# Patient Record
Sex: MALE | Race: WHITE | NOT HISPANIC OR LATINO | URBAN - METROPOLITAN AREA
[De-identification: names, ages, dates, MRNs, and addresses within clinical notes are randomized per-mention and may not be internally consistent; named-entity substitution may affect disease eponyms.]

---

## 2017-04-20 ENCOUNTER — COMMUNICATION - HEALTHEAST (OUTPATIENT)
Dept: HEALTH INFORMATION MANAGEMENT | Facility: CLINIC | Age: 37
End: 2017-04-20

## 2017-10-11 ENCOUNTER — OFFICE VISIT (OUTPATIENT)
Dept: FAMILY MEDICINE | Facility: CLINIC | Age: 37
End: 2017-10-11

## 2017-10-11 VITALS
SYSTOLIC BLOOD PRESSURE: 124 MMHG | DIASTOLIC BLOOD PRESSURE: 76 MMHG | OXYGEN SATURATION: 97 % | HEART RATE: 64 BPM | TEMPERATURE: 98.2 F | WEIGHT: 275.2 LBS

## 2017-10-11 DIAGNOSIS — J02.0 STREPTOCOCCAL SORE THROAT: Primary | ICD-10-CM

## 2017-10-11 DIAGNOSIS — K13.0 LIP FISSURE: ICD-10-CM

## 2017-10-11 LAB — S PYO AG THROAT QL IA.RAPID: NEGATIVE

## 2017-10-11 NOTE — PATIENT INSTRUCTIONS
You have a fissure in your upper lip that has not healed. I would like you see ENT for this. You can stop at the front and see Lori    Your strep came back negative, but will start you on 10 days of antibiotics, will send out the culture and will call you once the result comes back    Return to clinic 1-2 weeks after seeing ENT or if your symptoms worsens    [facility]  [address]  [place] [address] Main Number: [phone]  Fax: [phone]    Appointment  Date:10/16/17  Time:8:10am arrival    Please contact the above clinic if you need to cancel or reschedule. Feel free to contact me with any questions. Thanks!    Lori  Referral Coordinator  [phone]    Called and gave to patient over the phone.     *Park in the gold ramp.

## 2017-10-11 NOTE — LETTER
October 13, 2017      Nima Castellanos  [address] [place] [address]        Dear Nima,    Please see below for your test results.    Resulted Orders   Rapid Strep Screen (Group) ([facility])   Result Value Ref Range    Rapid Strep A Screen NEGATIVE Negative   Group A Strep Throat ([facility])   Result Value Ref Range    Group A Strep,Throat No Group A Strep rRNA detected No Group A Strep rRNA detected    Narrative    Test performed by:  [facility] [address] [place] [address]  Intended Use:  The GEN-PROBE Group A Streptococcus direct test is a DNA probe assay which   uses nucleic acid hybridization for the qualitative detection of Group A   Streptococcal RNA to aid in the diagnosis of Group A Streptococcal pharyngitis   from throat swabs.  Methodology:  The GEN-PROBE DNA probe assay uses a single-stranded DNA probe with a   chemiluminescent label, which is complementary to the ribosomal RNA of the   target organism.  The labeled DNA probe combines with the ribosomal RNA to   form a stable DNA:RNA hybrid.  The labeled DNA:RNA hybrids are measured in   GEN-PROBE luminometer.  A positive result is a luminometer reading greater   than or equal to the cut-off.  A value below this cut-off is a negative   result.       There was no strep A  In the confirmatory test, no need for antibiotics    If you have any questions, please call the clinic to make an appointment.    Sincerely,    Roc Garcia MD

## 2017-10-11 NOTE — MR AVS SNAPSHOT
After Visit Summary   10/11/2017    Nima Castellanos    MRN: [id number]           Patient Information     Date Of Birth          1980        Visit Information        Provider Department      10/11/2017 10:40 AM Roc Garcia MD [facility]        Today's Diagnoses     Streptococcal sore throat    -  1    Lip fissure          Care Instructions    You have a fissure in your upper lip that has not healed. I would like you see ENT for this. You can stop at the front and see Lori    Your strep came back negative, but will start you on 10 days of antibiotics, will send out the culture and will call you once the result comes back    Return to clinic 1-2 weeks after seeing ENT or if your symptoms worsens            Follow-ups after your visit        Additional Services     OTOLARYNGOLOGY REFERRAL       Upper lip fissure not healing for over 12 months   No obvious risk factors                  Future tests that were ordered for you today     Open Future Orders        Priority Expected Expires Ordered    OTOLARYNGOLOGY REFERRAL Routine  10/31/2017 10/11/2017            Who to contact     Please call your clinic at [phone] to:    Ask questions about your health    Make or cancel appointments    Discuss your medicines    Learn about your test results    Speak to your doctor   If you have compliments or concerns about an experience at your clinic, or if you wish to file a complaint, please contact [facility] Patient Relations at [phone] or email us at [email or web address]         Additional Information About Your Visit        MyChart Information     [employer] is an electronic gateway that provides easy, online access to your medical records. With Minded, you can request a clinic appointment, read your test results, renew a prescription or communicate with your care team.     To sign up for [employer] visit the website at [email or web address]   You will  be asked to enter the access code listed below, as well as some personal information. Please follow the directions to create your username and password.     Your access code is: [email or web address]  Expires: 2018 11:29 AM     Your access code will  in 90 days. If you need help or a new code, please contact your [facility] or call [phone] for assistance.        Care EveryWhere ID     This is your Care EveryWhere ID. This could be used by other organizations to access your [place] medical records  [id number]        Your Vitals Were     Pulse Temperature Pulse Oximetry             64 98.2  F (36.8  C) (Oral) 97%          Blood Pressure from Last 3 Encounters:   10/11/17 124/76    Weight from Last 3 Encounters:   10/11/17 275 lb 3.2 oz (124.8 kg)              We Performed the Following     Group A Strep Throat ([facility])     Rapid Strep Screen (Group) ([facility])        Primary Care Provider Office Phone # Fax #    Roc Garcia [phone]       [address] [place] [id number]        Equal Access to Services     [facility]: Hadii aad ku hadasho Soaleksali, waaxda luqadaha, qaybta kaalmada jose r, ashleigh anders . So [facility] [phone].    ATENCIÓN: Si habla español, tiene a haddad disposición servicios gratuitos de asistencia lingüística. [facility] [phone].    We comply with applicable federal civil rights laws and [place] laws. We do not discriminate on the basis of race, color, national origin, age, disability, sex, sexual orientation, or gender identity.            Thank you!     Thank you for choosing [facility]  for your care. Our goal is always to provide you with excellent care. Hearing back from our patients is one way we can continue to improve our services. Please take a few minutes to complete the written survey that you may receive in the mail after your visit with us. Thank you!             Your Updated Medication  List - Protect others around you: Learn how to safely use, store and throw away your medicines at www.disposemymeds.org.      Notice  As of 10/11/2017 11:29 AM    You have not been prescribed any medications.

## 2017-10-11 NOTE — PROGRESS NOTES
"S: Nima Castellanos is a 37 year old male who returns has hx of strep A pharyngitis 6 months ago, now with sore troath for 2 to 3 days, took amoxacillin one time last night  2 had son with strep in the past but child doesn't not have sore throat now   Risk factors are minimum: account  2 Fissure in lip that wants to have it check out, nor healing for a long time, no hx of colds sores, no trauma. 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      ROS:  General: No fevers, chills  Head: No headache  Ears: No acute change in hearing.    CV: No chest pain or palpitations.  Resp: No shortness of breath.  No cough. No hemoptysis.  GI: No nausea, vomiting, constipation, diarrhea  : No urinary pains    O: /76  Pulse 64  Temp 98.2  F (36.8  C) (Oral)  Wt 275 lb 3.2 oz (124.8 kg)  SpO2 97%   Gen:  Well nourished and in NAD  HEENT: PERRLA; TMs normal color and landmarks; nasopharynx pink and    moist; oropharynx pink and moist   Upper left side  Lip:  deep fissure up to vermilion border .  1 cm lateral location  Neck: supple without lymphadenopathy  CV:  RRR  - no murmurs, rubs, or gallups,   Pulm:  CTAB, no wheezes/rales/rhonchi, good air entry   ABD: soft, nontender, no masses, no rebound, BS intact throughout  Extrem: no cyanosis, edema or clubbing  Psych: Euthymic      (J02.0) Streptococcal sore throat  (primary encounter diagnosis)  Comment normal exam  Plan: Rapid Strep Screen (Group) ([facility]), Group A         Strep Throat ([facility])     Expect confirmatory test to be negative   Took one time dose of amoxicillin 10 hours prior to test _expect that it did not altered  test   Consider re testing in 2 weeks  (K13.0) Lip fissure.concer not healing for a long time\" over one year\"    Plan: OTOLARYNGOLOGY REFERRAL          RTC in 2  weeks, for follow up of " Re test for strep a or sooner if develops new or worsening symptoms.    Roc Garcia

## 2017-10-12 LAB — GROUP A STREP,THROAT: NORMAL

## 2018-01-15 ENCOUNTER — TRANSFERRED RECORDS (OUTPATIENT)
Dept: HEALTH INFORMATION MANAGEMENT | Facility: CLINIC | Age: 38
End: 2018-01-15

## 2020-03-29 ENCOUNTER — OFFICE VISIT - HEALTHEAST (OUTPATIENT)
Dept: FAMILY MEDICINE | Facility: CLINIC | Age: 40
End: 2020-03-29

## 2020-03-29 DIAGNOSIS — M79.672 LEFT FOOT PAIN: ICD-10-CM

## 2020-03-29 DIAGNOSIS — M76.891 HAMSTRING TENDONITIS OF RIGHT THIGH: ICD-10-CM

## 2020-03-29 ASSESSMENT — MIFFLIN-ST. JEOR: SCORE: 2297.92

## 2020-03-30 ENCOUNTER — COMMUNICATION - HEALTHEAST (OUTPATIENT)
Dept: SCHEDULING | Facility: CLINIC | Age: 40
End: 2020-03-30

## 2020-07-28 ENCOUNTER — TRANSFERRED RECORDS (OUTPATIENT)
Dept: HEALTH INFORMATION MANAGEMENT | Facility: CLINIC | Age: 40
End: 2020-07-28

## 2020-09-10 ENCOUNTER — COMMUNICATION - HEALTHEAST (OUTPATIENT)
Dept: CARDIOLOGY | Facility: CLINIC | Age: 40
End: 2020-09-10

## 2020-09-11 ENCOUNTER — OFFICE VISIT - HEALTHEAST (OUTPATIENT)
Dept: CARDIOLOGY | Facility: CLINIC | Age: 40
End: 2020-09-11

## 2020-09-11 DIAGNOSIS — R07.9 CHEST PAIN, UNSPECIFIED TYPE: ICD-10-CM

## 2020-09-15 ENCOUNTER — AMBULATORY - HEALTHEAST (OUTPATIENT)
Dept: CARDIOLOGY | Facility: CLINIC | Age: 40
End: 2020-09-15

## 2020-09-15 DIAGNOSIS — R07.9 CHEST PAIN, UNSPECIFIED TYPE: ICD-10-CM

## 2021-06-04 VITALS
OXYGEN SATURATION: 95 % | DIASTOLIC BLOOD PRESSURE: 72 MMHG | BODY MASS INDEX: 35.87 KG/M2 | SYSTOLIC BLOOD PRESSURE: 102 MMHG | WEIGHT: 287 LBS | RESPIRATION RATE: 20 BRPM | HEART RATE: 67 BPM

## 2021-06-04 VITALS
BODY MASS INDEX: 35.56 KG/M2 | HEIGHT: 75 IN | SYSTOLIC BLOOD PRESSURE: 118 MMHG | WEIGHT: 286 LBS | DIASTOLIC BLOOD PRESSURE: 79 MMHG | RESPIRATION RATE: 20 BRPM | TEMPERATURE: 98.2 F | HEART RATE: 51 BPM | OXYGEN SATURATION: 96 %

## 2021-06-07 NOTE — PROGRESS NOTES
"Assessment/Plan:     Problem List Items Addressed This Visit     None      Visit Diagnoses     Hamstring tendonitis of right thigh    -  Primary    Left foot pain        Relevant Orders    Ambulatory referral to Podiatry        I discussed the diagnosis and explanation for his thigh pain.  I recommended hamstring exercises at this time and to take over-the-counter pain medication as needed.  Consider reevaluation if it is not improving in the next couple of weeks.  I also placed a referral for podiatry.  He may have some low-grade arthritis but I suspect that assessing him for possible orthotic to support the midfoot when running would be a good idea.  The patient was comfortable with that plan.    Subjective:       40 y.o. male presents for evaluation primarily of right posterior upper thigh pain.  However, he also would like some advice regarding left foot pain.  The patient reports onset yesterday evening of pain at the bottom of his right buttock.  The patient states that it was fairly mild last night but then he felt it more intense today.  His primary concern was whether it could represent a blood clot.  He has been trying to exercise a bit more and he has been running recently.  The patient states that over the past month when he runs he gets pain in his midfoot on the top.  He has not noticed any associated swelling and has no prior history for injury to that foot.      Reviewed: The following portions of the patient's history were reviewed and updated as appropriate: allergies, current medications, past family history, past medical history, past social history, past surgical history and problem list.    Review of Systems  Pertinent items are noted in HPI.        Objective:     /79 (Patient Site: Right Arm, Patient Position: Sitting, Cuff Size: Adult Large)   Pulse (!) 51   Temp 98.2  F (36.8  C) (Oral)   Resp 20   Ht 6' 3\" (1.905 m)   Wt (!) 286 lb (129.7 kg)   SpO2 96%   BMI 35.75 kg/m  "   General appearance: alert, appears stated age and cooperative   Right upper leg: reproducible tenderness when palpating over the lateral tendon insertion of the hamstring muscle at the pelvis  Left foot: tenderness of the mid foot over a joint without associated swelling      This note has been dictated using voice recognition software. Any grammatical or context distortions are unintentional and inherent to the software
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
## 2021-06-11 NOTE — TELEPHONE ENCOUNTER
"Return call to patient. At start of phone call patient states \"I have no bala in you guys\" (patient has not been evaluated in heart clinic before) and \"I wanted a call from someone to tell me what to do before my appointment tomorrow because I am still having the same symptoms.\" Patient complained that person who scheduled his appointment was of no help-- explained to him that it is not the role or appropriate for a  to give him medical advice.  Attempted to explain to patient that this writer or Dr. Hernandez cannot provide medical advice to a patient they have not evaluated before. Instead of listening to this writer, patient proceeded to describe his Urgent Care visit and ED visit in entirety, complaining about rude staff. Encouraged patient to follow guidance provided by ED provider or contact his PCP. Patient did NOT verbalize understanding and repeatedly stated he did not have bala in doctors. -ejb    ----- Message from Abimbola Carreno sent at 9/10/2020 10:37 AM CDT -----  Regarding: [facility] PT / RAC APPT ON 9/11/20  General phone call:    Caller: Nima Zamora     Primary cardiologist: O[facility]RA     Detailed reason for call: Pt has a RAC appt scheduled with [facility] on 9/11 and is requesting a call back on recommendations on what to do in the meantime for his symptoms. Pt states he was sent home from [facility] ED on 9/9 while his symptoms were still active - he doesn't want to go back to the ED if it persists.     Best phone number: [phone]    Best time to contact: Today     Ok to leave a detailed message? Yes     Device? No     Additional Info:          "

## 2021-06-11 NOTE — TELEPHONE ENCOUNTER
Wellness Screening Tool  Symptom Screening:  Do you have one of the following NEW symptoms:    Fever (subjective or >100.0)?  No    A new cough?  No    Shortness of breath?  No     Chills? No     New loss of taste or smell? No     Generalized body aches? No     New persistent headache? No     New sore throat? No     Nausea, vomiting, or diarrhea?  No    Within the past 2 weeks, have you been exposed to someone with a known positive illness below:    COVID-19 (known or suspected)?  No    Chicken pox?  No    Mealses?  No    Pertussis?  No    Patient notified of visitor policy- They may have one person accompany them to their appointment, but they will need to wear a mask and will be screened upon arrival for symptoms: Yes  Pt informed to wear a mask: Yes  Pt notified if they develop any symptoms listed above, prior to their appointment, they are to call the clinic directly at [phone] for further instructions.  Yes  Patient's appointment status: Patient will be seen in clinic as scheduled on 9/11/20

## 2021-06-11 NOTE — PROGRESS NOTES
Consultation - [facility]  Nima Castellanos,  1980, MRN [id number]    PCP: Provider, [facility], [phone]    Assessment and Plan: Atypical chest pain  Sx not characteristic of angina but given the nature of his symptoms and age I did exclude cardiac origin will arrange for stress echo.  Recommendations: Echo    Chief Complaint: Atypical chest pain    HPI:  We have been requested by ER to evaluate Nima Castellanos for consultation who is a  40 y.o. year old male for above chief complaint.  Hx: 32-year-old man who came to the emergency room because of chest pain.  3-day history of recurring back pains that shoot into his chest.  He has felt occasional palpitations.  Denied shortness of breath GI symptoms dizziness or syncope.  Patient is a non-smoker.  Laboratory showed normal troponin.  Normal d-dimer.  EG normal sinus rhythm normal findings.  He describes the symptom as a very brief symptom that occurs in the chest that sometimes is feeling that is coming from his back.  Almost an instant and duration can happen a couple times has no clear provocative factors nothing seems to improve it typically is not at all exertional is not necessarily painful just a discomfort.  No current outpatient medications on file.  Medical History  Active Ambulatory (Non-Hospital) Problems    Diagnosis     Lip lesion     Anxiety     Chest pain     No past medical history on file.    Surgical History  He  has no past surgical history on file.    Social History  Reviewed, and he  reports that he has never smoked. He has never used smokeless tobacco.  Smoking status reviewed.  Social history othrwise not contributory to HPI.  Allergies  No Known Allergies    Family History  Reviewed, and family history is not on file.  Extended Emergency Contact Information  Primary Emergency Contact: Ratna Castellanos  Home Phone: [phone]  Relation: Spouse  Secondary Emergency Contact: declined, per pt  Relation:  Declined  Family history otherwise negative or not conributory to HPI.    Psychosocial Needs  Social History     Social History Narrative     Not on file     Additional psychosocial needs reviewed per nursing assessment.    Prior to Admission Medications  (Not in a hospital admission)      Review of Systems:  Pertinent items are noted in HPI.  A 12 point comprehensive review of systems was negative except as noted.  Review of systems is negative except for HPI  Physical Exam:  There were no vitals filed for this visit.  Head and neck without focal cranial neurologic defects.  JVD not distended.  Carotid upstroke normal without bruit.  External eye exam normal without icterus.  External ear exam normal.  Neck without cervical lymphadenopathy or thyromegaly.  Cardiac: S1-S2 distinct and regular  Lungs: Clear  Abdomen with normal bowel tones.  Skin without rash, ecchymosis, lesions.  Neuromuscular tone normal.  Peripheral pulse intact and equal.  Joints without swelling or erythema.    Pertinent Labs  Lab Results: personally reviewed.   Lab Results   Component Value Date     09/09/2020    K 4.1 09/09/2020     09/09/2020    CO2 25 09/09/2020    BUN 15 09/09/2020    CREATININE 1.07 09/09/2020    CALCIUM 9.7 09/09/2020     Lab Results   Component Value Date    TROPONINI <0.01 09/09/2020     Lab Results   Component Value Date    TROPONINI <0.01 09/09/2020    TROPONINI <0.01 09/09/2020     Lab Results   Component Value Date    WBC 7.1 09/09/2020    HGB 15.3 09/09/2020    HCT 44.7 09/09/2020    MCV 87 09/09/2020     09/09/2020     No results found for: CHOL, TRIG, HDL, LDLDIRECT    Pertinent Radiology  Radiology Results: See Report  EKG Results: personally reviewed.  and See Report     No current outpatient medications on file.

## 2021-06-16 PROBLEM — F41.9 ANXIETY: Status: ACTIVE | Noted: 2018-08-15

## 2021-06-16 PROBLEM — K13.0 LIP LESION: Status: ACTIVE | Noted: 2018-08-21

## 2021-08-14 ENCOUNTER — HEALTH MAINTENANCE LETTER (OUTPATIENT)
Age: 41
End: 2021-08-14

## 2021-10-09 ENCOUNTER — HEALTH MAINTENANCE LETTER (OUTPATIENT)
Age: 41
End: 2021-10-09

## 2022-05-23 ENCOUNTER — APPOINTMENT (OUTPATIENT)
Dept: URBAN - METROPOLITAN AREA CLINIC 260 | Age: 42
Setting detail: DERMATOLOGY
End: 2022-05-26

## 2022-05-23 VITALS — HEIGHT: 76 IN | WEIGHT: 300 LBS

## 2022-05-23 DIAGNOSIS — L663 OTHER SPECIFIED DISEASES OF HAIR AND HAIR FOLLICLES: ICD-10-CM

## 2022-05-23 DIAGNOSIS — L73.8 OTHER SPECIFIED FOLLICULAR DISORDERS: ICD-10-CM

## 2022-05-23 DIAGNOSIS — L30.1 DYSHIDROSIS [POMPHOLYX]: ICD-10-CM

## 2022-05-23 DIAGNOSIS — L81.4 OTHER MELANIN HYPERPIGMENTATION: ICD-10-CM

## 2022-05-23 PROBLEM — L02.425 FURUNCLE OF RIGHT LOWER LIMB: Status: ACTIVE | Noted: 2022-05-23

## 2022-05-23 PROCEDURE — OTHER COUNSELING: OTHER

## 2022-05-23 PROCEDURE — 99204 OFFICE O/P NEW MOD 45 MIN: CPT

## 2022-05-23 PROCEDURE — OTHER MIPS QUALITY: OTHER

## 2022-05-23 PROCEDURE — OTHER PRESCRIPTION: OTHER

## 2022-05-23 RX ORDER — DESOXIMETASONE 2.5 MG/G
CREAM TOPICAL
Qty: 60 | Refills: 6 | Status: ERX | COMMUNITY
Start: 2022-05-23

## 2022-05-23 ASSESSMENT — LOCATION SIMPLE DESCRIPTION DERM
LOCATION SIMPLE: LEFT UPPER ARM
LOCATION SIMPLE: RIGHT THIGH
LOCATION SIMPLE: LEFT CHEEK
LOCATION SIMPLE: PENIS
LOCATION SIMPLE: RIGHT HAND

## 2022-05-23 ASSESSMENT — LOCATION DETAILED DESCRIPTION DERM
LOCATION DETAILED: LEFT ANTERIOR PROXIMAL UPPER ARM
LOCATION DETAILED: RIGHT ANTERIOR PROXIMAL THIGH
LOCATION DETAILED: RIGHT DORSAL SHAFT OF PENIS
LOCATION DETAILED: LEFT MEDIAL MALAR CHEEK
LOCATION DETAILED: RIGHT DORSAL MIDDLE METACARPOPHALANGEAL JOINT

## 2022-05-23 ASSESSMENT — LOCATION ZONE DERM
LOCATION ZONE: LEG
LOCATION ZONE: HAND
LOCATION ZONE: FACE
LOCATION ZONE: PENIS
LOCATION ZONE: ARM

## 2022-09-17 ENCOUNTER — HEALTH MAINTENANCE LETTER (OUTPATIENT)
Age: 42
End: 2022-09-17

## 2022-09-21 ENCOUNTER — OFFICE VISIT (OUTPATIENT)
Dept: CT IMAGING | Facility: CLINIC | Age: 42
End: 2022-09-21
Attending: INTERNAL MEDICINE
Payer: COMMERCIAL

## 2022-09-21 ENCOUNTER — LAB (OUTPATIENT)
Dept: LAB | Facility: CLINIC | Age: 42
End: 2022-09-21
Attending: INTERNAL MEDICINE
Payer: COMMERCIAL

## 2022-09-21 ENCOUNTER — MYC MEDICAL ADVICE (OUTPATIENT)
Dept: CT IMAGING | Facility: CLINIC | Age: 42
End: 2022-09-21

## 2022-09-21 VITALS
HEART RATE: 75 BPM | DIASTOLIC BLOOD PRESSURE: 69 MMHG | BODY MASS INDEX: 34.3 KG/M2 | SYSTOLIC BLOOD PRESSURE: 113 MMHG | OXYGEN SATURATION: 97 % | WEIGHT: 274.4 LBS

## 2022-09-21 DIAGNOSIS — L30.9 DERMATITIS: Primary | ICD-10-CM

## 2022-09-21 DIAGNOSIS — L30.9 DERMATITIS: ICD-10-CM

## 2022-09-21 LAB
ALBUMIN SERPL-MCNC: 4.3 G/DL (ref 3.4–5)
ALP SERPL-CCNC: 65 U/L (ref 40–150)
ALT SERPL W P-5'-P-CCNC: 42 U/L (ref 0–70)
AST SERPL W P-5'-P-CCNC: 20 U/L (ref 0–45)
BILIRUB DIRECT SERPL-MCNC: 0.2 MG/DL (ref 0–0.2)
BILIRUB SERPL-MCNC: 0.7 MG/DL (ref 0.2–1.3)
PROT SERPL-MCNC: 8.4 G/DL (ref 6.8–8.8)

## 2022-09-21 PROCEDURE — 99205 OFFICE O/P NEW HI 60 MIN: CPT | Performed by: REGISTERED NURSE

## 2022-09-21 PROCEDURE — 87149 DNA/RNA DIRECT PROBE: CPT

## 2022-09-21 PROCEDURE — 36415 COLL VENOUS BLD VENIPUNCTURE: CPT

## 2022-09-21 PROCEDURE — 86364 TISS TRNSGLTMNASE EA IG CLAS: CPT | Mod: 59

## 2022-09-21 PROCEDURE — 80076 HEPATIC FUNCTION PANEL: CPT

## 2022-09-21 PROCEDURE — 82784 ASSAY IGA/IGD/IGG/IGM EACH: CPT

## 2022-09-21 PROCEDURE — 87077 CULTURE AEROBIC IDENTIFY: CPT

## 2022-09-21 PROCEDURE — 87449 NOS EACH ORGANISM AG IA: CPT

## 2022-09-21 RX ORDER — PRENATAL VIT 91/IRON/FOLIC/DHA 28-975-200
COMBINATION PACKAGE (EA) ORAL 2 TIMES DAILY
COMMUNITY

## 2022-09-21 NOTE — PROGRESS NOTES
[facility]  Transplant Infectious Disease Clinic Note:  New Patient     Patient:  Nima Castellanos, Date of birth 1980, Medical record number [id number]  Date of Visit:  09/21/2022  Consult requested by Dr. 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         Assessment and Recommendations:   Recommendations:  1. Checking labs  2. Dermatology referral placed  3. Recommended to stop taking all herbal supplements at this time  4. Requested records from [facility]  5. Follow-up in 2 to 3 weeks for virtual visit      Assessment:  Dermatitis:  We are currently working on getting the patient's medical records from [facility] to verify what tests have been performed.  He appears well today, is vitally stable, and denies any systemic complaints.  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 Also considered in the differential: nummular eczema, psoriasis, tinea corporis, or the possibility of this being an infectious process overlying a more chronic condition such as eczema. We will also order IgA and tissue transglutaminase. A referral to dermatology was placed.  Liver function tests also ordered given the patient is on oral voriconazole.  Recommended the patient stop using the topical terbinafine until he can follow-up with dermatology, as we are unsure if this is helping or worsening the rash/lesions.  The rash on the patient's bilateral hands appears different from the lesions or nummular rash seen on his left shin and calf, and I suspect this is potentially a different pathologic process.  Per chart review and patient report of improvement of his bilateral hand rash with the  triamcinolone cream, along with appearance on physical exam consistent with atopic dermatitis, I suspect that this is likely a reappearance of the same process.  Will defer to dermatology for further work-up of the bilateral hand rash and further testing needed to diagnose the left shin and calf lesions/rash.  Several images of the patient's various dermatological conditions were taken today and uploaded to Epic.  It is difficult to make any comment on the patient's stool tests as we do not have the medical records from the institution that provided the test to even know what was performed.  However, we did have a discussion with the patient about how the gut microbiome contains various bacteria that are important for our health.  It is relatively rare for bacteria from the colon to translocate into the bloodstream and cause sepsis. As long as he is not experiencing any concerning symptoms such as fever, chills, rigors, night sweats, abdominal pain, or diarrhea, then we are not concerned at this time and no further testing is necessary.  He seemed reassured with this explanation.  We plan to follow-up with the patient in 2 to 3 weeks to discuss the results of today's labs.        I spent 70 minutes as part of a shared visit on the date of the encounter doing chart review, history and exam, documentation.      GRAY Carlson, CNP  Infectious Diseases  Pager# [phone]           History of the Infectious Disease lllness:     Nima Castellanos is a 42-year-old male with a past medical history per chart review of low back pain, migraine headaches, dyshidrotic eczema, tinea cruris, and possible lichen planus who presents today with concerns for bilateral hand rash x1 year, and new umbilical rash, and left shin and calf rash x6 months.    The patient states that the rash on his hand began with 1 finger and then spread to the other fingers on the top of the hand over the period of a year.  The rash is now involving both  hands.  This hand rash was treated, at [facility] medicine, as dyshidrotic eczema with triamcinolone 0.1% topical cream.  The patient noted improvement in his bilateral hand rash after using this cream, however, he stated that the rash returned and then at that he stopped using the cream.    The rash on the left Shin started in May, 2022. He states it started as a dime sized red, non-pruritic, spot.  The spot continued to grow over the summer and he has developed a new spot on his left calf that is similar in appearance, as well as a series of small red bumps across the shin, which he explains have mostly healed.  Denies the spots/rash as being painful or pruritic.  He was seen at [facility], approximately 2 weeks ago, where he says they swabbed the left shin rash.  The results, per patient, showed Fusarium and he was prescribed topical terbinafine and oral Voriconazole by Dr. Wali Burns. He started applying the topical terbinafine, two weeks ago, to all sports/rashes immediately and has not noted much improvement since starting this.  He did say that several hours after applying the terbinafine cream he experiences a tingling sensation with the rash/spots.  He started the oral voriconazole one week ago and has noted lightening of the color of the left shin spot but notes the one on the back of his calf has not improved. The left shin spot started bleeding last week.    The patient also noticed 1 raised red bump on the edge of his bellybutton 3 days ago, which has since then developed into multiple small raised red bumps around the bellybutton.    The patient also reports submitting a stool sample.  I am unsure exactly what test was provided for the stool sample, but he showed me results where there was a breakdown of all the various bacteria that showed up in his stool.  He was told he had a dysbiosis in his gut and he was most concerned about the presence of Enterobacter  cloacae on the readout from the test.  He was also worried about developing sepsis from this particular bacteria.    He states that he really wants to make sure that he gets everything cleared up with his health because he wants to be able to play with his son and now has a new baby on the way.       He also takes 2 over-the-counter herbal supplements: Beta-TCP (what is these, doesn't list ingredients) and Linotrit Plus([place] tinospora, boerhavia, ecliptia, andrographis, picrohiza, [place] barberry, milk thistle, sugarcane) herbal supplements.     He lives at home with his wife and they have 1 young child and they just found out that she is expecting.  He enjoys gardening and notes that he was out earlier this spring pulling weeds while wearing shorts.   Denies alcohol, no drug use.       In care everywhere, per Dr. Maxwell's note 9/13/22:  Nima Castellanos is a 42 y.o. male who presents today with his wife regarding skin rash. This has been an ongoing issue for which he has been seen in multiple places including by 2 dermatologists. I can see notes from [facility] primary care but no dermatology notes or notes from [facility] where he reports he had a fungal culture that grew Fusarium. Patient and his wife state they have copies of those records in their home but those were not brought with today.    They had previously been seen repeatedly for this and not until a fungal culture was done in August and came back yesterday was this diagnosis of Fusarium made. It sounds like he had been treated with multiple steroids and antifungals without success. He also has ongoing problems with hand eczema that has been unsuccessfully treated. That has never been scraped for fungal culture.    His current skin lesions are the one on his left lower leg anteriorly that was cultured. He has 1 on his posterior lower leg, 2 in his left axilla, and 1 in his umbilicus.    It became clear further into the visit that the provider  who made this diagnosis at [facility] had prescribed topical Lamisil and oral voriconazole though it sounds like a prior authorization is pending for the voriconazole. It does not sound like he has had liver function tests done for 1 to 2 months. Patient and his wife are requesting referral to infectious disease regarding this infection and whether this treatment is appropriate. They do not want to see dermatology as his rash was misdiagnosed repeatedly by 2 different dermatology groups, [facility] and [facility].     Patient has many concerns about whether he could be immunocompromised that could make him at risk for this infection or for invasive infection. He is monogamous with his wife. No chronic medical problems aside from his current skin issues.         Past Medical History:   Diagnosis Date     Uncomplicated asthma        No past surgical history on file.    Family History   Problem Relation Age of Onset     Diabetes No family hx of      Coronary Artery Disease No family hx of      Hypertension No family hx of      Other Cancer No family hx of        Social History     Social History Narrative     Not on file     Social History     Tobacco Use     Smoking status: Never Smoker     Smokeless tobacco: Never Used   Substance Use Topics     Alcohol use: No     Drug use: No         There is no immunization history on file for this patient.    Patient Active Problem List   Diagnosis     Anxiety     Chest pain     Lip lesion       No outpatient medications have been marked as taking for the 9/21/22 encounter (Appointment) with Kelly Dangelo MD.       No Known Allergies           Physical Exam:   Vitals were reviewed.  All vitals stable  There were no vitals taken for this visit.  Wt Readings from Last 4 Encounters:   09/11/20 130.2 kg (287 lb)   03/29/20 129.7 kg (286 lb)   10/11/17 124.8 kg (275 lb 3.2 oz)       Exam:  GENERAL: well-developed, well-nourished, alert, oriented, concerned about his health.  HEAD:  Head is normocephalic, atraumatic   EYES: Eyes have anicteric sclerae.    ENT: Oropharynx is moist   NECK: Supple.   LUNGS: no audible wheezes or visual signs of respiratory distress  CARDIOVASCULAR: no lower extremity edema  SKIN:  Bilateral hands with erythema across most digits and dorsal aspects, wrapping below to palmar aspect, blanchable, dry and scalded appearance. Left shin with palm-sized, well-demarcated area of erythema and skin peeling, scattered areas of friable skin, with area of lightening in the center. Left calf with nickel-sized erythematous lesion beginning to peel. Scattered erythematous papules across left shin and calf. Umbilicus with surrounding scattered patchy erythema.   NEUROLOGIC: Grossly nonfocal         Laboratory Data:     No results found for: ACD4    Inflammatory Markers  No lab results found.    Invalid input(s): RATE, AUTO, ESR, WESR    Immune Globulin Studies   No lab results found.    Metabolic Studies    Recent Labs   Lab Test 09/09/20  1844      POTASSIUM 4.1   CHLORIDE 103   CO2 25   ANIONGAP 10   BUN 15   CR 1.07   GFRESTIMATED >60   GLC 90   DOMINICK 9.7       Hepatic Studies    Recent Labs   Lab Test 09/21/22  1210 09/09/20  1844   BILITOTAL 0.7 0.4   DBIL 0.2  --    ALKPHOS 65 61   PROTTOTAL 8.4 7.8   ALBUMIN 4.3 4.2   AST 20 18   ALT 42 35       Pancreatitis testing    Recent Labs   Lab Test 09/09/20  1844   LIPASE 39       Lipid testing  No lab results found.    Gout Labs    No lab results found.    Hematology Studies   Recent Labs   Lab Test 09/09/20  1844   WBC 7.1   ANEUTAUTO 3.4   ALYMPAUTO 2.7   AMONOAUTO 0.7   ABSBASO 0.0   HGB 15.3   HCT 44.7          Clotting Studies  No lab results found.    Invalid input(s): 83488    Iron Testing    Recent Labs   Lab Test 09/09/20  1844   MCV 87       Markers  No lab results found.    Invalid input(s): FETOPROTEIN, SERUM, AFP    Autoimmune Testing   No lab results found.    Invalid input(s): ANCAB, PANCA,  CANCA    Arterial Blood Gas Testing  No lab results found.     Thyroid Studies   No lab results found.    Invalid input(s): FT4    Urine Studies   No lab results found.    Medication levels  No lab results found.    Invalid input(s): AMIK    CSF testing   No lab results found.    Invalid input(s): CADAM, EVPCR, ENTPCR, ENTEROVIRUS    Microbiology:  Fungal testing  No lab results found.    Invalid input(s): HIFUN, FUNGL    Beta D Glucan levels (Fungitell assay)    No results found for: FGTL, FGTLI     Last Culture results   Rapid Strep A Screen   Date Value Ref Range Status   10/11/2017 NEGATIVE Negative Final         Last check of C difficile  No results found for: CDBPCT    No components found for: AFBSTN    Syphilis Testing  Invalid input(s): BCU8089    Tick Testing  No lab results found.    Invalid input(s): APHAGM    ASO Testing  Invalid input(s): UPI8163    Quantiferon testing   Recent Labs   Lab Test 09/09/20  1844   LYMPH 38       Infection Studies to assess Diarrhea  No lab results found.    Invalid input(s): NNDMRESULT    Virology:  Coronavirus-19 testing    No lab results found.    Invalid input(s): SCV2R    Respiratory virus (non-coronavirus-19) testing    No lab results found.    CMV viral loads  No results found for: CMVQNT, CMVRESINST, CMVLOG, 24906, 51301, 51504, 28197    CMV resistance testing  No lab results found.  No results found for: CMVCID, CMVFOS, CMVGAN    No results found for: H6RES    No results found for: EBVDN, EBRES, EBVDN, EBVSP, EBVPC, EBVPCR    BK viral loads No lab results found.    Parvovirus Testing  No lab results found.    Invalid input(s): PRVRES    Adenovirus Testing  No lab results found.    Invalid input(s): ADENAB, ADENOVIRUS, ADQT    Hepatitis B Testing   No lab results found.  Was the last Hepatitis B E antigen positive?   No results found for: HBEAGN   No results found for: HCVAB, HQTG, HCGENO, HCPCR, HQTRNA, HEPRNA, CRYOG    No results found for: CMVIGG, CMVM, CMVIM,  CMIG, CMVG, CMIGG, CMIM, CMVIGM, CMLTX, HSVG1, HSVG2, HSVTP1, WK7532, HS12M, HS12GR, HS1GR, HS2GR, HSIM, HSIG, HSIGR, HSVIGMAB, HSVG1, VZVIGG, VARICZOSAB  No results found for: EBVCAG, EBIG2, EBIGM, EBVIGG, EBIGG, EBVAGN, IP7323, TOXG  No results found for: H1IGG, H2IGG, EBVCAM    No components found for: KRD7274    Last Pathology Report No results found for: CASEREPORT, CLININFO, FINALDX    Imaging:  No results found for this or any previous visit.

## 2022-09-21 NOTE — PATIENT INSTRUCTIONS
- We are checking some labs today and will follow up on these at our next visit    - Please stop taking all herbal supplements while you are on the voriconazole    - A dermatology referral was also placed. They will be able to provide further workup in collaboration with us to help better understand what is causing your skin changes.      - We will also look forward to obtaining the records from [facility] to ensure you are on the correct medication and will look forward to the workup they have already provided     - Though there is no evidence to recommend for or against probiotics, incorporating things such as yogurt or kombucha into your daily diet cannot hurt.  We also recommend getting good sleep, eating more high-fiber foods, and drinking lots of water to help encourage a healthy gut.    - Plan to follow-up with us in 2 to 3 weeks through virtual visit

## 2022-09-21 NOTE — NURSING NOTE
"Nima Castellanos's goals for this visit include:   Chief Complaint   Patient presents with     Infection     fungal infection on left leg, right hand, bellybutton, right armpit ongoing for 5 months starting to spread     stool test     Had stool test end of August and in the stools Enterobacteriaceae, concerned it might be due to infection. Has been trying herbs and probiotic       PCP: Roc Garcia    Referring Provider:  No referring provider defined for this encounter.      Initial /69 (BP Location: Left arm, Patient Position: Sitting, Cuff Size: Adult Large)   Pulse 75   Wt 124.5 kg (274 lb 6.4 oz)   SpO2 97%   BMI 34.30 kg/m   Estimated body mass index is 34.3 kg/m  as calculated from the following:    Height as of 9/9/20: 1.905 m (6' 3\").    Weight as of this encounter: 124.5 kg (274 lb 6.4 oz).    Medication Reconciliation: complete    Do you need any medication refills at today's visit? none    SHIELA Agustin  Rheumatology/Infectious disease  [facility]   [phone]    "

## 2022-09-22 LAB
ENTEROCOCCUS FAECALIS: NOT DETECTED
ENTEROCOCCUS FAECIUM: NOT DETECTED
IGA SERPL-MCNC: 376 MG/DL (ref 84–499)
LISTERIA SPECIES (DETECTED/NOT DETECTED): NOT DETECTED
STAPHYLOCOCCUS AUREUS: NOT DETECTED
STAPHYLOCOCCUS EPIDERMIDIS: DETECTED
STAPHYLOCOCCUS LUGDUNENSIS: NOT DETECTED
STREPTOCOCCUS AGALACTIAE: NOT DETECTED
STREPTOCOCCUS ANGINOSUS GROUP: NOT DETECTED
STREPTOCOCCUS PNEUMONIAE: NOT DETECTED
STREPTOCOCCUS PYOGENES: NOT DETECTED
STREPTOCOCCUS SPECIES: NOT DETECTED
TTG IGA SER-ACNC: 0.8 U/ML
TTG IGG SER-ACNC: <0.6 U/ML

## 2022-09-23 LAB
1,3 BETA GLUCAN SER-MCNC: <31 PG/ML
OBSERVATION IMP: NEGATIVE

## 2022-09-24 LAB
BACTERIA BLD CULT: ABNORMAL
BACTERIA BLD CULT: ABNORMAL

## 2022-09-26 LAB — BACTERIA BLD CULT: NO GROWTH

## 2022-09-27 NOTE — TELEPHONE ENCOUNTER
FUTURE VISIT INFORMATION      FUTURE VISIT INFORMATION:    Date: 9.30.22    Time: 8:00    Location: [facility]  REFERRAL INFORMATION:    Referring provider:  Loreto    Referring providers clinic:      Reason for visit/diagnosis  dermatitis    RECORDS REQUESTED FROM:       Clinic name Comments Records Status Imaging Status   [facility] 9.26.22  Olesya FISHER   [facility] 9.21.22  [facility] 8.30.22, 5.11.22  Sadaf FISHER na   [facility] 8.29.22  Nabor FISHER na

## 2022-09-30 ENCOUNTER — PRE VISIT (OUTPATIENT)
Dept: DERMATOLOGY | Facility: CLINIC | Age: 42
End: 2022-09-30

## 2023-01-04 ENCOUNTER — HOSPITAL ENCOUNTER (EMERGENCY)
Facility: CLINIC | Age: 43
Discharge: HOME | End: 2023-01-04
Attending: EMERGENCY MEDICINE | Admitting: EMERGENCY MEDICINE
Payer: COMMERCIAL

## 2023-01-04 VITALS
RESPIRATION RATE: 18 BRPM | OXYGEN SATURATION: 96 % | TEMPERATURE: 98.4 F | DIASTOLIC BLOOD PRESSURE: 85 MMHG | SYSTOLIC BLOOD PRESSURE: 133 MMHG | HEART RATE: 79 BPM

## 2023-01-04 DIAGNOSIS — R13.10 DYSPHAGIA, UNSPECIFIED TYPE: ICD-10-CM

## 2023-01-04 PROCEDURE — 99282 EMERGENCY DEPT VISIT SF MDM: CPT

## 2023-01-05 NOTE — ED TRIAGE NOTES
"Pt presents with swallowing problems for last two months. Per pt, pt swallowed shrimp approximately two months ago and has been having difficulty with swallowing since. Pt reports waking up \"with dry throat and wasn't able to swallow\". Pt reports \"mucus in throat\". Talking in full sentences in traige, able to eat and drink and no visible oral swelling. ABCs intact.     Triage Assessment     Row Name 01/04/23 5808       Triage Assessment (Adult)    Airway WDL WDL       Respiratory WDL    Respiratory WDL WDL       Skin Circulation/Temperature WDL    Skin Circulation/Temperature WDL WDL       Cardiac WDL    Cardiac WDL WDL       Peripheral/Neurovascular WDL    Peripheral Neurovascular WDL WDL       Cognitive/Neuro/Behavioral WDL    Cognitive/Neuro/Behavioral WDL WDL              "

## 2023-01-05 NOTE — DISCHARGE INSTRUCTIONS
Please follow-up with gastroenterology tomorrow as previously arranged  Please initiate the omeprazole(Prilosec) as previously prescribed  Return to the emergency department for worsening problems or concerns

## 2023-01-05 NOTE — ED PROVIDER NOTES
"EMERGENCY DEPARTMENT ENCOUnter      NAME: Nima Castellanos  AGE: 42 year old male  YOB: 1980  MRN: [id number]  EVALUATION DATE & TIME: No admission date for patient encounter.    PCP: Roc Garcia    ED PROVIDER: Latonia Melo MD      Chief Complaint   Patient presents with     Throat Problem         FINAL IMPRESSION:  1. Dysphagia, unspecified type          ED COURSE & MEDICAL DECISION MAKING:      In summary, the patient is a 42-year-old male that presents to the emergency department for evaluation of difficulty swallowing.  He was seen virtually earlier today and diagnosed with likely esophagitis and prescribed Prilosec.  He has an appointment with GI tomorrow.  I do not think he has any emergent condition that requires any other intervention other than what was recommended earlier in the day.  He is able to eat and drink without difficulty.  9:38 PM I met with patient for initial interview and encounter. PPE worn includes N95 mask.    At the conclusion of the encounter I discussed the results of all of the tests and the disposition. The questions were answered. The patient or family acknowledged understanding and was agreeable with the care plan.         MEDICATIONS GIVEN IN THE EMERGENCY:  Medications - No data to display    NEW PRESCRIPTIONS STARTED AT TODAY'S ER VISIT  New Prescriptions    No medications on file          =================================================================    HPI        Nima Castellanos is a 42 year old male with a pertinent history of anxiety who presents to this ED via walk-in for evaluation of trouble swallowing.    Patient reports he has had intermittent trouble swallowing for the past few months which has become more frequent over the past 2 days. Says that episodes have gone from once per week to every time he swallows. He states that he is able to eat and drink, but sometimes feels like food gets \"stuck\" in his throat, causing him to spit out his " "food. He states that he may be trying to swallow chunks of food which are too large. He also thinks that he may have \"mucus\" in his throat. He did have a virtual visit with his PCP earlier today and was told to start taking Prilosec. He also has an appointment with GI tomorrow, but wanted to get checked out in the ED today out of an abundance of caution. Patient denies any other complaints. He is not on any prescription medications. He denies any drug or alcohol use. 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       REVIEW OF SYSTEMS     Constitutional:  Denies fever or chills  HENT:  Positive for trouble swallowing. Denies sore throat   Respiratory:  Denies cough or shortness of breath   Cardiovascular:  Denies chest pain or palpitations  GI:  Denies abdominal pain, nausea, or vomiting  Musculoskeletal:  Denies any new extremity pain   Skin:  Denies rash   Neurologic:  Denies headache, focal weakness or sensory changes    All other systems reviewed and are negative      PAST MEDICAL HISTORY:  Past Medical History:   Diagnosis Date     Uncomplicated asthma        PAST SURGICAL HISTORY:  History reviewed. No pertinent surgical history.        CURRENT MEDICATIONS:    METRONIDAZOLE PO  terbinafine (LAMISIL) 1 % external cream        ALLERGIES:  No Known Allergies    FAMILY HISTORY:  Family History   Problem Relation Age of Onset     Diabetes No family hx of      Coronary Artery Disease No family hx of      Hypertension No family hx of      Other Cancer No family hx of        SOCIAL HISTORY:   Social History     Socioeconomic History     Marital status:      Spouse name: None     Number of children: None     Years of education: None     Highest education level: None   Tobacco Use     Smoking status: Never     Smokeless tobacco: Never   Substance and Sexual Activity     Alcohol use: No     Drug use: No       VITALS:  Patient Vitals for the past 24 hrs:   BP Temp Temp src Pulse Resp SpO2   01/04/23 2110 133/85 " 98.4  F (36.9  C) Temporal 79 18 96 %       PHYSICAL EXAM    Constitutional:  Well developed, Well nourished,  HENT:  Normocephalic, Atraumatic, Bilateral external ears normal, Oropharynx moist, Nose normal.   Neck:  Normal range of motion, No meningismus, No stridor.   Eyes:  EOMI, Conjunctiva normal, No discharge.   Respiratory:  Normal breath sounds, No respiratory distress, No wheezing, No chest tenderness.   Cardiovascular:  Normal heart rate, Normal rhythm, No murmurs  GI:  Soft, No tenderness, No guarding,   Musculoskeletal:  Neurovascularly intact distally, No edema, No tenderness, No cyanosis, Good range of motion in all major joints.   Integument:  Warm, Dry, No erythema, No rash.   Lymphatic:  No lymphadenopathy noted.   Neurologic:  Alert & oriented , Normal motor function,  No focal deficits noted.   Psychiatric:  Affect normal, Judgment normal, Mood normal.              Latonia Melo MD  Emergency Medicine  [facility] EMERGENCY ROOM  [address] [address] [phone]  Dept: [phone]       Latonia Melo MD  01/04/23 3526

## 2023-01-24 ENCOUNTER — HOSPITAL ENCOUNTER (OUTPATIENT)
Dept: SPEECH THERAPY | Facility: CLINIC | Age: 43
Setting detail: THERAPIES SERIES
Discharge: HOME | End: 2023-01-24
Attending: FAMILY MEDICINE | Admitting: FAMILY MEDICINE
Payer: COMMERCIAL

## 2023-01-24 ENCOUNTER — HOSPITAL ENCOUNTER (OUTPATIENT)
Dept: RADIOLOGY | Facility: CLINIC | Age: 43
Discharge: HOME | End: 2023-01-24
Attending: INTERNAL MEDICINE
Payer: COMMERCIAL

## 2023-01-24 DIAGNOSIS — R09.A2 GLOBUS SENSATION: ICD-10-CM

## 2023-01-24 DIAGNOSIS — Z71.3 DIETARY COUNSELING AND SURVEILLANCE: ICD-10-CM

## 2023-01-24 DIAGNOSIS — R13.12 TRANSFER DYSPHAGIA: ICD-10-CM

## 2023-01-24 PROCEDURE — 92611 MOTION FLUOROSCOPY/SWALLOW: CPT | Mod: GN

## 2023-01-24 PROCEDURE — 74230 X-RAY XM SWLNG FUNCJ C+: CPT

## 2023-01-24 PROCEDURE — 92526 ORAL FUNCTION THERAPY: CPT | Mod: GN

## 2023-01-24 PROCEDURE — 74220 X-RAY XM ESOPHAGUS 1CNTRST: CPT

## 2023-01-25 NOTE — PROGRESS NOTES
Videofluoroscopic Swallow Study with Speech Pathology     01/24/23 0915   General Information   Type Of Visit Initial   Start Of Care Date 01/24/23   Referring Physician Singh Foreman MD  ([facility])   Orders Evaluate And Treat   Medical Diagnosis Transfer dysphagia; globus sensation; dietary counseling and surveillance   Onset Of Illness/injury Or Date Of Surgery 01/05/23  (Order date)   Pertinent History of Current Problem/OT: Additional Occupational Profile Info Patient reports globus sensation. He states that it feels like he has mucous left in his throat after swallows. He also reports some soreness in his throat; particularly on the right side. Additionally, patient states that certain foods (e.g., chips, pieces of lettuce) tend to get stuck in his throat. He also reports frequent burping. Occasionally, he will need to burp but feels like he can't. Patient endorses an increase in these symptoms when he eats fast and/or eats on his couch. He also reports that stress seems to increase his symptoms.   Respiratory Status Room air   General Observations Patient arrived to this appointment unaccompanied. He was pleasant, but presented with blunted affect and was highly anxious regarding video swallow study and esophagram. He expressed concern about the necessity of the evaluations, as he has noticed a reduction in his symptoms since appointment was scheduled. Additionally, he was concerned about the safety of the evaluations; specifically, whether the barium is safe to consume, as well as potential side effects of radiation exposure. He was particularly concerned about the effects of radiation to his thyroid, as he had read that the thyroid gland is more sensitive to radiation. Both SLP and Radiologist assured patient that the evaluations are considered safe, that he is unlikely to experience any ill effects from drinking barium, and that his radiation exposure would be minimal. Radiologist educated patient about  radiation dose reduction techniques. Patient was also informed that he can decline participation if he wishes. After much consideration, he elected to complete both evaluations.   VFSS Evaluation   VFSS Additional Documentation Yes   VFSS Eval: Radiology   Radiologist Jade Pandya MD   Views Taken left lateral  (A/P view was not indicated)   Physical Location of Procedure [facility]   VFSS Eval: Thin Liquid Texture Trial   Mode of Presentation, Thin Liquid cup;spoon;self-fed   Order of Presentation Trials 1, 2  (Additional trials were not given d/t patient anxiety)   Preparatory Phase WFL   Oral Phase, Thin Liquid WFL   Pharyngeal Phase, Thin Liquid WFL   Rosenbek's Penetration Aspiration Scale: Thin Liquid Trial Results 1 - no aspiration, contrast does not enter airway   Diagnostic Statement Oropharyngeal swallow function is WNL with thin liquid.   VFSS Eval: Puree Solid Texture Trial   Mode of Presentation, Puree spoon;self-fed   Order of Presentation Trials 3, 4  (Additional trials were not given d/t patient anxiety)   Preparatory Phase WFL   Oral Phase, Puree WFL   Pharyngeal Phase, Puree WFL   Rosenbek's Penetration Aspiration Scale: Puree Food Trial Results 1 - no aspiration, contrast does not enter airway   Diagnostic Statement Oropharyngeal swallow function is WNL with puree consistency.   Esophageal Phase of Swallow   Patient reports or presents with symptoms of esophageal dysphagia Yes   Esophageal sweep performed during today s vidofluoroscopic exam  Please refer to radiologist's report for details   Esophageal comments Patient's symptoms are suggestive of poorly controlled GERD. Please refer to Radiologist's dedicated esophagram report for details re: esophageal function.   General Therapy Interventions   Planned Therapy Interventions Dysphagia Treatment   Dysphagia treatment Modified diet education  (Dietary changes for GERD management)   Swallow Eval: Clinical  Impressions   Skilled Criteria for Therapy Intervention Skilled criteria met.  Treatment indicated.   Dysphagia Outcome Severity Scale (CATARINA) Level 7 - CATARINA   Treatment Diagnosis Esophageal dysphagia; GERD   Diet texture recommendations Regular diet;Thin liquids (level 0)  (Patient to avoid foods & beverages known to exacerbate GERD symptoms)   Recommended Feeding/Eating Techniques maintain upright posture during/after eating for 30 mins;small sips/bites;alternate between small bites and sips of food/liquid;other (see comments)  (Eat smaller, more frequent meals.)   Rehab Potential good, to achieve stated therapy goals   Therapy Frequency other (see comments)  (1 session)   Predicted Duration of Therapy Intervention (days/wks) 1 session  (Today)   Risks and Benefits of Treatment have been explained. Yes   Patient, family and/or staff in agreement with Plan of Care Yes   Clinical Impression Comments Videofluoroscopic swallow study completed per provider order. Patient's oropharyngeal swallow function is WNL. Oral phase is characterized by good oral control, effective bolus prep, and timely posterior bolus transit. Tongue base retractions is WNL. Pharyngeal phase is characterized by a timely (in fact, very rapid) swallow response. Epiglottic inversion is complete and timely. Hyolaryngeal excursion and hyolaryngeal elevation are WFL. Pharyngeal constriction is good. Bolus material readily passed through the cervical esophagus. No aspiration or laryngeal penetration occurred during this evaluation. There was no oropharyngeal stasis after swallows.     Patient's aspiration risk is low. He is safe to continue his current diet of regular food textures and thin liquids with use of standard safe swallowing precautions. Patient was provided with verbal and written education regarding dietary and lifestyle changes for management of GERD symptoms. He verbalized understanding of this information and seemed receptive to following  GERD precautions. Notably, patient's anxiety was significantly reduced by end of session. He seemed relieved to discover that his symptoms are related to reflux, and felt confident that he could manage this by adhering to recommendations for dietary and lifestyle changes. If possible, patient would like to avoid starting a PPI.   Swallow Goals   SLP Swallow Goals 1   Swallow Goal 1   Goal Identifier Strategies for GERD management   Goal Description Patient will verbalize understanding of dietary and lifestyle changes for management of GERD symptoms.   Goal Progress Goal met   Target Date 01/24/23   Date Met 01/24/23   Total Session Time   SLP Eval: VideoFluoroscopic Swallow function Minutes (23625) 8   Total Evaluation Time 8 minutes   Therapy Certification   Certification date from 01/24/23   Certification date to 01/24/23   Medical Diagnosis Transfer dysphagia; globus sensation

## 2023-01-25 NOTE — PROGRESS NOTES
OUTPATIENT SWALLOW  EVALUATION  PLAN OF TREATMENT FOR OUTPATIENT REHABILITATION  (COMPLETE FOR INITIAL CLAIMS ONLY)  Patient's Last Name, First Name, M.I.  YOB: 1980  Nima Castellanos     Provider's Name   JAQUAN Head   Medical Record No.  [id number]     Start of Care Date:  01/24/23   Onset Date:  01/05/23 (Order date)   Type:     ___PT   ____OT  ___X_SLP Medical Diagnosis:  Transfer dysphagia; globus sensation     Treatment Diagnosis:  Esophageal dysphagia; GERD Visits from SOC:  1     _________________________________________________________________________________  Plan of Treatment/Functional Goals:  Planned Therapy Interventions: Dysphagia Treatment  Dysphagia treatment: Modified diet education (Dietary changes for GERD management)         Goals   1. Goal Identifier: Strategies for GERD management       Goal Description: Patient will verbalize understanding of dietary and lifestyle changes for management of GERD symptoms.       Target Date: 01/24/23       Date Met: 01/24/23            Therapy Frequency: other (see comments) (1 session)  Predicted Duration of Therapy Intervention (days/wks): 1 session (Today)    JAQUAN Head         I CERTIFY THE NEED FOR THESE SERVICES FURNISHED UNDER        THIS PLAN OF TREATMENT AND WHILE UNDER MY CARE .         Physician Signature               Date    X_____________________________________________________      Certification date from: 01/24/23 Certification date to: 01/24/23          Referring Physician: Singh Foreman MD ([facility])    Initial Assessment        See Epic Evaluation Start Of Care Date: 01/24/23

## 2023-10-07 ENCOUNTER — HEALTH MAINTENANCE LETTER (OUTPATIENT)
Age: 43
End: 2023-10-07

## 2024-11-30 ENCOUNTER — HEALTH MAINTENANCE LETTER (OUTPATIENT)
Age: 44
End: 2024-11-30

## 2025-01-21 ENCOUNTER — APPOINTMENT (OUTPATIENT)
Dept: URBAN - METROPOLITAN AREA CLINIC 260 | Age: 45
Setting detail: DERMATOLOGY
End: 2025-01-21

## 2025-01-21 VITALS — HEIGHT: 75 IN | WEIGHT: 275 LBS

## 2025-01-21 DIAGNOSIS — L82.1 OTHER SEBORRHEIC KERATOSIS: ICD-10-CM

## 2025-01-21 DIAGNOSIS — L73.8 OTHER SPECIFIED FOLLICULAR DISORDERS: ICD-10-CM

## 2025-01-21 DIAGNOSIS — D22 MELANOCYTIC NEVI: ICD-10-CM

## 2025-01-21 DIAGNOSIS — L85.3 XEROSIS CUTIS: ICD-10-CM

## 2025-01-21 DIAGNOSIS — L81.8 OTHER SPECIFIED DISORDERS OF PIGMENTATION: ICD-10-CM

## 2025-01-21 DIAGNOSIS — L663 OTHER SPECIFIED DISEASES OF HAIR AND HAIR FOLLICLES: ICD-10-CM

## 2025-01-21 DIAGNOSIS — L72.8 OTHER FOLLICULAR CYSTS OF THE SKIN AND SUBCUTANEOUS TISSUE: ICD-10-CM

## 2025-01-21 DIAGNOSIS — D18.0 HEMANGIOMA: ICD-10-CM

## 2025-01-21 DIAGNOSIS — L81.4 OTHER MELANIN HYPERPIGMENTATION: ICD-10-CM

## 2025-01-21 DIAGNOSIS — Z71.89 OTHER SPECIFIED COUNSELING: ICD-10-CM

## 2025-01-21 PROBLEM — D22.5 MELANOCYTIC NEVI OF TRUNK: Status: ACTIVE | Noted: 2025-01-21

## 2025-01-21 PROBLEM — L02.424 FURUNCLE OF LEFT UPPER LIMB: Status: ACTIVE | Noted: 2025-01-21

## 2025-01-21 PROBLEM — L02.423 FURUNCLE OF RIGHT UPPER LIMB: Status: ACTIVE | Noted: 2025-01-21

## 2025-01-21 PROBLEM — D18.01 HEMANGIOMA OF SKIN AND SUBCUTANEOUS TISSUE: Status: ACTIVE | Noted: 2025-01-21

## 2025-01-21 PROBLEM — D23.39 OTHER BENIGN NEOPLASM OF SKIN OF OTHER PARTS OF FACE: Status: ACTIVE | Noted: 2025-01-21

## 2025-01-21 PROCEDURE — OTHER MIPS QUALITY: OTHER

## 2025-01-21 PROCEDURE — 99213 OFFICE O/P EST LOW 20 MIN: CPT

## 2025-01-21 PROCEDURE — OTHER ADDITIONAL NOTES: OTHER

## 2025-01-21 PROCEDURE — OTHER COUNSELING: OTHER

## 2025-01-21 ASSESSMENT — LOCATION DETAILED DESCRIPTION DERM
LOCATION DETAILED: RIGHT ANTERIOR DISTAL UPPER ARM
LOCATION DETAILED: LEFT ANTERIOR DISTAL UPPER ARM
LOCATION DETAILED: LEFT INFERIOR MEDIAL MIDBACK
LOCATION DETAILED: LEFT DISTAL PRETIBIAL REGION
LOCATION DETAILED: RIGHT INFERIOR CENTRAL MALAR CHEEK
LOCATION DETAILED: INFERIOR THORACIC SPINE
LOCATION DETAILED: RIGHT POSTERIOR EAR
LOCATION DETAILED: SUPERIOR LUMBAR SPINE

## 2025-01-21 ASSESSMENT — LOCATION ZONE DERM
LOCATION ZONE: EAR
LOCATION ZONE: LEG
LOCATION ZONE: FACE
LOCATION ZONE: TRUNK
LOCATION ZONE: ARM

## 2025-01-21 ASSESSMENT — LOCATION SIMPLE DESCRIPTION DERM
LOCATION SIMPLE: LEFT LOWER BACK
LOCATION SIMPLE: RIGHT CHEEK
LOCATION SIMPLE: UPPER BACK
LOCATION SIMPLE: LEFT UPPER ARM
LOCATION SIMPLE: LEFT PRETIBIAL REGION
LOCATION SIMPLE: LOWER BACK
LOCATION SIMPLE: RIGHT UPPER ARM
LOCATION SIMPLE: RIGHT EAR

## 2025-01-21 NOTE — PROCEDURE: ADDITIONAL NOTES
Detail Level: Simple
Render Risk Assessment In Note?: no
Additional Notes: Discussed electrodessication as a treatment option. Patient to contact clinic if he opts to have these treated. 
Additional Notes: Discussed removal and patient will think about it and make appointment if he would like to have the spot removed
Additional Notes: Discussed electrodessication as a treatment option. Patient to contact clinic if he opts to have this lesion treated.

## 2025-01-21 NOTE — HPI: FULL BODY SKIN EXAMINATION
What Type Of Note Output Would You Prefer (Optional)?: Standard Output
What Is The Reason For Today's Visit?: Full Body Skin Examination
What Is The Reason For Today's Visit? (Being Monitored For X): the development of a new lesion
Additional History: One spot of concern on left leg.